# Patient Record
Sex: FEMALE | Race: WHITE | Employment: OTHER | URBAN - METROPOLITAN AREA
[De-identification: names, ages, dates, MRNs, and addresses within clinical notes are randomized per-mention and may not be internally consistent; named-entity substitution may affect disease eponyms.]

---

## 2019-08-27 ENCOUNTER — MEDICAL CORRESPONDENCE (OUTPATIENT)
Dept: HEALTH INFORMATION MANAGEMENT | Facility: CLINIC | Age: 69
End: 2019-08-27

## 2019-08-27 ENCOUNTER — TRANSFERRED RECORDS (OUTPATIENT)
Dept: HEALTH INFORMATION MANAGEMENT | Facility: CLINIC | Age: 69
End: 2019-08-27

## 2019-10-03 ENCOUNTER — TELEPHONE (OUTPATIENT)
Dept: OPHTHALMOLOGY | Facility: CLINIC | Age: 69
End: 2019-10-03

## 2019-10-03 ENCOUNTER — OFFICE VISIT (OUTPATIENT)
Dept: OPHTHALMOLOGY | Facility: CLINIC | Age: 69
End: 2019-10-03
Attending: OPHTHALMOLOGY
Payer: MEDICARE

## 2019-10-03 DIAGNOSIS — H55.09 DOWNBEAT NYSTAGMUS: ICD-10-CM

## 2019-10-03 DIAGNOSIS — H50.22 HYPERTROPIA OF LEFT EYE: ICD-10-CM

## 2019-10-03 DIAGNOSIS — H55.09 DOWNBEAT NYSTAGMUS: Primary | ICD-10-CM

## 2019-10-03 DIAGNOSIS — H53.10 SUBJECTIVE VISUAL DISTURBANCE: ICD-10-CM

## 2019-10-03 DIAGNOSIS — H53.10 SUBJECTIVE VISUAL DISTURBANCE: Primary | ICD-10-CM

## 2019-10-03 PROBLEM — M19.90 OSTEOARTHRITIS: Status: ACTIVE | Noted: 2017-07-03

## 2019-10-03 PROBLEM — E11.9 TYPE 2 DIABETES MELLITUS (H): Status: ACTIVE | Noted: 2018-10-17

## 2019-10-03 PROBLEM — E78.1 HYPERTRIGLYCERIDEMIA: Status: ACTIVE | Noted: 2017-10-30

## 2019-10-03 PROBLEM — A69.23 LYME ARTHRITIS (H): Status: ACTIVE | Noted: 2019-10-03

## 2019-10-03 PROBLEM — K21.9 GASTROESOPHAGEAL REFLUX DISEASE: Status: ACTIVE | Noted: 2017-07-03

## 2019-10-03 PROBLEM — I25.3 PFO WITH ATRIAL SEPTAL ANEURYSM: Status: ACTIVE | Noted: 2018-10-17

## 2019-10-03 PROBLEM — J45.909 ASTHMA: Status: ACTIVE | Noted: 2019-10-03

## 2019-10-03 PROBLEM — I63.40: Status: ACTIVE | Noted: 2019-10-03

## 2019-10-03 PROBLEM — G93.89 ENCEPHALOMALACIA: Status: ACTIVE | Noted: 2019-10-03

## 2019-10-03 PROBLEM — R13.10 DYSPHAGIA: Status: ACTIVE | Noted: 2018-10-17

## 2019-10-03 PROBLEM — I67.9 CEREBROVASCULAR SMALL VESSEL DISEASE: Status: ACTIVE | Noted: 2019-10-03

## 2019-10-03 PROBLEM — J30.2 SEASONAL ALLERGIC RHINITIS: Status: ACTIVE | Noted: 2019-10-03

## 2019-10-03 PROBLEM — J45.40 MODERATE PERSISTENT ASTHMA WITHOUT COMPLICATION: Status: ACTIVE | Noted: 2017-07-03

## 2019-10-03 PROBLEM — R47.01 EXPRESSIVE APHASIA: Status: ACTIVE | Noted: 2019-10-03

## 2019-10-03 PROBLEM — A69.20 LYME DISEASE: Status: ACTIVE | Noted: 2018-08-17

## 2019-10-03 PROBLEM — Z86.73 HX OF ISCHEMIC LEFT MCA STROKE: Status: ACTIVE | Noted: 2018-11-14

## 2019-10-03 PROBLEM — I63.412 CEREBRAL INFARCTION DUE TO EMBOLISM OF LEFT MIDDLE CEREBRAL ARTERY (H): Status: ACTIVE | Noted: 2019-10-03

## 2019-10-03 PROBLEM — G81.91 RIGHT HEMIPARESIS (H): Status: ACTIVE | Noted: 2019-10-03

## 2019-10-03 PROBLEM — E53.8 VITAMIN B12 DEFICIENCY: Status: ACTIVE | Noted: 2018-10-17

## 2019-10-03 PROBLEM — N18.30 CKD (CHRONIC KIDNEY DISEASE) STAGE 3, GFR 30-59 ML/MIN (H): Status: ACTIVE | Noted: 2017-07-03

## 2019-10-03 PROBLEM — N14.0 ANALGESIC NEPHROPATHY: Status: ACTIVE | Noted: 2018-03-12

## 2019-10-03 PROBLEM — Q21.12 PFO WITH ATRIAL SEPTAL ANEURYSM: Status: ACTIVE | Noted: 2018-10-17

## 2019-10-03 PROBLEM — F41.8 SITUATIONAL ANXIETY: Status: ACTIVE | Noted: 2018-05-21

## 2019-10-03 PROBLEM — M17.11 PRIMARY OSTEOARTHRITIS OF RIGHT KNEE: Status: ACTIVE | Noted: 2019-04-22

## 2019-10-03 LAB
ERYTHROCYTE [DISTWIDTH] IN BLOOD BY AUTOMATED COUNT: 14.9 % (ref 10–15)
HCT VFR BLD AUTO: 42.2 % (ref 35–47)
HGB BLD-MCNC: 13.6 G/DL (ref 11.7–15.7)
MAGNESIUM SERPL-MCNC: 2.4 MG/DL (ref 1.6–2.3)
MCH RBC QN AUTO: 28.8 PG (ref 26.5–33)
MCHC RBC AUTO-ENTMCNC: 32.2 G/DL (ref 31.5–36.5)
MCV RBC AUTO: 89 FL (ref 78–100)
PLATELET # BLD AUTO: 238 10E9/L (ref 150–450)
RBC # BLD AUTO: 4.73 10E12/L (ref 3.8–5.2)
WBC # BLD AUTO: 7.4 10E9/L (ref 4–11)

## 2019-10-03 PROCEDURE — 36415 COLL VENOUS BLD VENIPUNCTURE: CPT | Performed by: OPHTHALMOLOGY

## 2019-10-03 PROCEDURE — G0463 HOSPITAL OUTPT CLINIC VISIT: HCPCS | Mod: 25,ZF

## 2019-10-03 PROCEDURE — 92060 SENSORIMOTOR EXAMINATION: CPT | Mod: ZF | Performed by: OPHTHALMOLOGY

## 2019-10-03 PROCEDURE — 86341 ISLET CELL ANTIBODY: CPT | Performed by: OPHTHALMOLOGY

## 2019-10-03 RX ORDER — LIOTHYRONINE SODIUM 5 UG/1
TABLET ORAL
Refills: 3 | COMMUNITY
Start: 2019-09-05

## 2019-10-03 RX ORDER — ALBUTEROL SULFATE 5 MG/ML
2.5 SOLUTION RESPIRATORY (INHALATION)
COMMUNITY
Start: 2012-11-13

## 2019-10-03 RX ORDER — NALTREXONE HYDROCHLORIDE 50 MG/1
TABLET, FILM COATED ORAL
Refills: 0 | COMMUNITY
Start: 2019-09-16

## 2019-10-03 RX ORDER — LANOLIN ALCOHOL/MO/W.PET/CERES
1000 CREAM (GRAM) TOPICAL
COMMUNITY

## 2019-10-03 RX ORDER — ASPIRIN 81 MG/1
81 TABLET, CHEWABLE ORAL
COMMUNITY
Start: 2018-10-18

## 2019-10-03 RX ORDER — ALPRAZOLAM 0.5 MG
.25-.5 TABLET ORAL
COMMUNITY
Start: 2009-04-24

## 2019-10-03 RX ORDER — ESCITALOPRAM OXALATE 10 MG/1
10 TABLET ORAL
COMMUNITY
Start: 2018-08-08

## 2019-10-03 RX ORDER — PSEUDOEPHED/ACETAMINOPH/DIPHEN 30MG-500MG
TABLET ORAL
Refills: 0 | COMMUNITY
Start: 2019-04-24

## 2019-10-03 RX ORDER — TRIAMCINOLONE ACETONIDE 55 UG/1
2 SPRAY, METERED NASAL EVERY 24 HOURS
COMMUNITY
Start: 2012-11-13

## 2019-10-03 RX ORDER — BACLOFEN 10 MG/1
TABLET ORAL
Refills: 6 | COMMUNITY
Start: 2019-09-22

## 2019-10-03 RX ORDER — MECLIZINE HYDROCHLORIDE 25 MG/1
25 TABLET ORAL
COMMUNITY
Start: 2012-11-13

## 2019-10-03 RX ORDER — TRAZODONE HYDROCHLORIDE 100 MG/1
100 TABLET ORAL
COMMUNITY
Start: 1990-01-01

## 2019-10-03 RX ORDER — ATORVASTATIN CALCIUM 20 MG/1
20 TABLET, FILM COATED ORAL
COMMUNITY
Start: 2018-10-10

## 2019-10-03 RX ORDER — BUPROPION HYDROCHLORIDE 300 MG/1
TABLET ORAL
Refills: 4 | COMMUNITY
Start: 2019-09-24

## 2019-10-03 RX ORDER — METFORMIN HCL 500 MG
TABLET, EXTENDED RELEASE 24 HR ORAL
Refills: 4 | COMMUNITY
Start: 2019-09-05

## 2019-10-03 RX ORDER — CETIRIZINE HYDROCHLORIDE 10 MG/1
10 TABLET ORAL
COMMUNITY
Start: 1999-04-01

## 2019-10-03 ASSESSMENT — VISUAL ACUITY
OS_CC: 20/40
OD_CC: 20/40
METHOD: SNELLEN - LINEAR
CORRECTION_TYPE: GLASSES

## 2019-10-03 ASSESSMENT — CUP TO DISC RATIO
OD_RATIO: 0.2
OS_RATIO: 0.2

## 2019-10-03 ASSESSMENT — TONOMETRY
IOP_METHOD: ICARE
OS_IOP_MMHG: 11
OD_IOP_MMHG: 10

## 2019-10-03 ASSESSMENT — SLIT LAMP EXAM - LIDS
COMMENTS: NORMAL
COMMENTS: NORMAL

## 2019-10-03 ASSESSMENT — CONF VISUAL FIELD
OD_NORMAL: 1
OS_NORMAL: 1

## 2019-10-03 NOTE — TELEPHONE ENCOUNTER
Spoke to [facility].  Gave them verbal authorization for prescription for 4-aminopyridine 4 times daily with 11 refills.  They will contact patient to get set to her.      Sandra De Luna on 10/3/2019 at 4:15 PM

## 2019-10-03 NOTE — PROGRESS NOTES
Assessment & Plan     Laura Davis is a 69 year old female with the following diagnoses:   1. Downbeat nystagmus    2. Subjective visual disturbance    3. Hypertropia of left eye       Patient is a 69 year old female sent by Dr. Garcia for evaluation of oscillopsia and downbeat nystagmus. She had a left frontal stroke in October 2018, but she was having oscillopsia before her stroke. She notes that the oscillopsia was intermittent prior to the stroke. She has been taking oral B12 replacement for over the past 10 years. She reports that her dose was recently reduced 1 week ago due to being too high. She has been having headaches since her stroke. She has been started on Baclofen and does not report improvement since being on it. She has tried prism glasses which seemed to make her oscillopsia worse. She reports being clumsy all of her life and has had multiple ankle surgeries. She had a seizure when she had Lyme disease. Denies diplopia, eye pain, redness, diarrhea.     MRI brain 9/5/19  Impression:  1. New region of encephalomalacia in the left posterior frontal lobe that includes the lateral aspect of the precentral gyrus, and new focus of encephalomalacia in the left occipital cortex. Findings are consistent with interval new chronic infarcts.   2. Encephalomalacia in the right middle frontal gyrus and left superior parietal lobule, consistent with stable chronic infarcts. Small chronic infarcts in the cerebellum.   3. Multiple small discrete foci of T2 prolongation are present in the periventricular and subcortical white matter, nonspecific but most commonly noted in the setting of chronic small vessel ischemic changes.    POH: Refractive error, cataract surgery both eyes 2017  PMH: Asthma, GERD, Vitamin B 12 deficiency, fibromyalgia, hypothyroidism, dysthymic disorder, hyperlipidemia, hypothyroidism s/p surgical removal, Insulin resistance, Chronic kidney disease stage 3   FH: Mother with stroke at  age 39, negative for glaucoma, macular degeneration    Visual acuity is 20/40 in both eyes. Intraocular pressure is normal in both eyes . Pupils normal with no afferent pupillary defect. Color plates full in both eyes. Confrontational visual fields full both eyes. Motility full both eyes. Slit lamp exam with pseudophakia in both eyes. Dilated fundus exam unremarkable.    It is my impression that Laura has downbeat nystagmus. I have personally reviewed the MRI and there is cerebellar atrophy present most notably on the sagittal section.  We discussed that could medications 4-aminopyridine     The differential diagnosis for downbeat nystagmus is broad and includes structural etiologies typically seen on MRI (e.g. Arnold Chiari malformation, cerebellar atrophy / spinocerebellar degenerations, stroke, and mass lesions near the cervicomedullary junction, demyelinating disease), paraneoplastic cerebellar disorders, inflammatory cerebellar disorders (e.g. Celiac sprue associated cerebellar ataxia, anti-MAYNOR, Hashimotos), drug effects (e.g. Lithium, anti-seizure medications, ETOH, etc), and nutritional deficiencies (B-12, magnesium, thiamine).    With this in mind, I will recommend the following work-up as she has already had MRI brain:  - Labs: complete blood count (CBC), B-12, magnesium, thiamine, anti-MAYNOR.     Recommend discontinuing Baclofen and start 4-AP.  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  She has had a loop recorder and no arrhythmias documented (her  is a FP).  We discussed resting upright and also Kestenbaum surgery.  She will try resting upright and if that is not beneficial then she will try the 4-a P.  She will think about the Kestenbaum.         Attending Physician Attestation:  Complete documentation of historical and exam elements from today's encounter can be found in the full encounter summary report (not reduplicated in this progress note).  I  personally obtained the chief complaint(s) and history of present illness.  I confirmed and edited as necessary the review of systems, past medical/surgical history, family history, social history, and examination findings as documented by others; and I examined the patient myself.  I personally reviewed the relevant tests, images, and reports as documented above.  I formulated and edited as necessary the assessment and plan and discussed the findings and management plan with the patient and family. - Dustin Brown MD  Ophthalmology, PGY-5  Neuro-Ophthalmology Fellow

## 2019-10-03 NOTE — Clinical Note
10/3/2019       RE: Laura Davis  [address] [place] [address]     Dear Colleague,    Thank you for referring your patient, Laura Davis, to the EYE CLINIC at [facility]. Please see a copy of my visit note below.           Assessment & Plan     Laura Davis is a 69 year old female with the following diagnoses:   1. Downbeat nystagmus    2. Subjective visual disturbance    3. Hypertropia of left eye       Patient is a 69 year old female sent by Dr. Garcia for evaluation of oscillopsia and downbeat nystagmus. She had a left frontal stroke in October 2018, but she was having oscillopsia before her stroke. She notes that the oscillopsia was intermittent prior to the stroke. She has been taking oral B12 replacement for over the past 10 years. She reports that her dose was recently reduced 1 week ago due to being too high. She has been having headaches since her stroke. She has been started on Baclofen and does not report improvement since being on it. She has tried prism glasses which seemed to make her oscillopsia worse. She reports being clumsy all of her life and has had multiple ankle surgeries. She had a seizure when she had Lyme disease. Denies diplopia, eye pain, redness, diarrhea.     MRI brain 9/5/19  Impression:  1. New region of encephalomalacia in the left posterior frontal lobe that includes the lateral aspect of the precentral gyrus, and new focus of encephalomalacia in the left occipital cortex. Findings are consistent with interval new chronic infarcts.   2. Encephalomalacia in the right middle frontal gyrus and left superior parietal lobule, consistent with stable chronic infarcts. Small chronic infarcts in the cerebellum.   3. Multiple small discrete foci of T2 prolongation are present in the periventricular and subcortical white matter, nonspecific but most commonly noted in the setting of chronic small vessel ischemic changes.    POH:  Refractive error, cataract surgery both eyes 2017  PMH: Asthma, GERD, Vitamin B 12 deficiency, fibromyalgia, hypothyroidism, dysthymic disorder, hyperlipidemia, hypothyroidism s/p surgical removal, Insulin resistance, Chronic kidney disease stage 3   FH: Mother with stroke at age 39, negative for glaucoma, macular degeneration    Visual acuity is 20/40 in both eyes. Intraocular pressure is normal in both eyes . Pupils normal with no afferent pupillary defect. Color plates full in both eyes. Confrontational visual fields full both eyes. Motility full both eyes. Slit lamp exam with pseudophakia in both eyes. Dilated fundus exam unremarkable.    It is my impression that Laura has downbeat nystagmus. I have personally reviewed the MRI and there is cerebellar atrophy present most notably on the sagittal section.  We discussed that could medications 4-aminopyridine     The differential diagnosis for downbeat nystagmus is broad and includes structural etiologies typically seen on MRI (e.g. Arnold Chiari malformation, cerebellar atrophy / spinocerebellar degenerations, stroke, and mass lesions near the cervicomedullary junction, demyelinating disease), paraneoplastic cerebellar disorders, inflammatory cerebellar disorders (e.g. Celiac sprue associated cerebellar ataxia, anti-MAYNOR, Hashimotos), drug effects (e.g. Lithium, anti-seizure medications, ETOH, etc), and nutritional deficiencies (B-12, magnesium, thiamine).    With this in mind, I will recommend the following work-up as she has already had MRI brain:  - Labs: complete blood count (CBC), B-12, magnesium, thiamine, anti-MAYNOR.     Recommend discontinuing Baclofen and start 4-AP.  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  She has had a loop recorder and no arrhythmias documented (her  is a FP).  We discussed resting upright and also Kestenbaum surgery.  She will try resting upright and if that is not beneficial then  she will try the 4-a P.  She will think about the Kestenbaum.         Attending Physician Attestation:  Complete documentation of historical and exam elements from today's encounter can be found in the full encounter summary report (not reduplicated in this progress note).  I personally obtained the chief complaint(s) and history of present illness.  I confirmed and edited as necessary the review of systems, past medical/surgical history, family history, social history, and examination findings as documented by others; and I examined the patient myself.  I personally reviewed the relevant tests, images, and reports as documented above.  I formulated and edited as necessary the assessment and plan and discussed the findings and management plan with the patient and family. - Dustin Brown MD  Ophthalmology, PGY-5  Neuro-Ophthalmology Fellow      Again, thank you for allowing me to participate in the care of your patient.      Sincerely,    Dustin Nelson MD

## 2019-10-03 NOTE — LETTER
10/3/2019         RE:  :  MRN: Laura Davis  1950  [id number]     Dear Dr. Garcia,    Thank you for asking me to see your very pleasant patient, Laura Davis, in neuro-ophthalmic consultation.  I would like to thank you for sending your records and I have summarized them in the history of present illness. She presented with her spouse who provided additional history.  My assessment and plan are below.  For further details, please see my attached clinic note.        Assessment & Plan     Laura Davis is a 69 year old female with the following diagnoses:   1. Downbeat nystagmus    2. Subjective visual disturbance    3. Hypertropia of left eye       Patient is a 69 year old female sent by Dr. Garcia for evaluation of oscillopsia and downbeat nystagmus. She had a left frontal stroke in 2018, but she was having oscillopsia before her stroke. She notes that the oscillopsia was intermittent prior to the stroke. She has been taking oral B12 replacement for over the past 10 years. She reports that her dose was recently reduced 1 week ago due to being too high. She has been having headaches since her stroke. She has been started on Baclofen and does not report improvement since being on it. She has tried prism glasses which seemed to make her oscillopsia worse. She reports being clumsy all of her life and has had multiple ankle surgeries. She had a seizure when she had Lyme disease. Denies diplopia, eye pain, redness, diarrhea.     MRI brain 19  Impression:  1. New region of encephalomalacia in the left posterior frontal lobe that includes the lateral aspect of the precentral gyrus, and new focus of encephalomalacia in the left occipital cortex. Findings are consistent with interval new chronic infarcts.   2. Encephalomalacia in the right middle frontal gyrus and left superior parietal lobule, consistent with stable chronic infarcts. Small chronic infarcts in the cerebellum.   3. Multiple  small discrete foci of T2 prolongation are present in the periventricular and subcortical white matter, nonspecific but most commonly noted in the setting of chronic small vessel ischemic changes.    POH: Refractive error, cataract surgery both eyes 2017  PMH: Asthma, GERD, Vitamin B 12 deficiency, fibromyalgia, hypothyroidism, dysthymic disorder, hyperlipidemia, hypothyroidism s/p surgical removal, Insulin resistance, Chronic kidney disease stage 3   FH: Mother with stroke at age 39, negative for glaucoma, macular degeneration    Visual acuity is 20/40 in both eyes. Intraocular pressure is normal in both eyes . Pupils normal with no afferent pupillary defect. Color plates full in both eyes. Confrontational visual fields full both eyes. Motility full both eyes. Slit lamp exam with pseudophakia in both eyes. Dilated fundus exam unremarkable.    It is my impression that Laura has downbeat nystagmus. I have personally reviewed the MRI and there is cerebellar atrophy present most notably on the sagittal section.  We discussed that could medications 4-aminopyridine     The differential diagnosis for downbeat nystagmus is broad and includes structural etiologies typically seen on MRI (e.g. Arnold Chiari malformation, cerebellar atrophy / spinocerebellar degenerations, stroke, and mass lesions near the cervicomedullary junction, demyelinating disease), paraneoplastic cerebellar disorders, inflammatory cerebellar disorders (e.g. Celiac sprue associated cerebellar ataxia, anti-MAYNOR, Hashimotos), drug effects (e.g. Lithium, anti-seizure medications, ETOH, etc), and nutritional deficiencies (B-12, magnesium, thiamine).    With this in mind, I will recommend the following work-up as she has already had MRI brain:  - Labs: complete blood count (CBC), B-12, magnesium, thiamine, anti-MAYNOR.     Recommend discontinuing Baclofen and start 4-AP.  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   She has had a loop recorder and no arrhythmias documented (her  is a FP).  We discussed resting upright and also Kestenbaum surgery.  She will try resting upright and if that is not beneficial then she will try the 4-a P.  She will think about the Kestenbaum.    Again, thank you for allowing me to participate in the care of your patient.      Sincerely,    Dustin Nelson MD  Professor  Ophthalmology Residency   Director of Neuro-Ophthalmology  [employer] [facility] [employer] Endowed Chair  Departments of Ophthalmology, Neurology, and Neurosurgery  [facility] [address]  [address] [place] [address]  [address]  T - [phone]  F - [phone]  LILIBETH Sagastume [email or web address]      CC: Giuseppe Garcia MD  [facility]  [address] [facility] [address]  VIA Facsimile: [phone]     Daria Montgomery NP  [facility]  [address]  [facility] [address]  VIA Facsimile: [phone]       DX = downbeat nystagmus

## 2019-10-03 NOTE — NURSING NOTE
Chief Complaints and History of Present Illnesses   Patient presents with     Nystagmus Evaluation     Chief Complaint(s) and History of Present Illness(es)     Nystagmus Evaluation               Comments     Laura Davis is a 69 year old female who presents today for  History of old left frontal stroke with residual dysphia and mild right hemiparesis. Patient also has a history of downbeat nystagmus and complaining of constant oscillopsia after the stroke last year. No diplopia.     Taqueria MARTINEZ 1:20 PM October 3, 2019

## 2019-10-06 LAB — GAD65 AB SER IA-ACNC: <5 IU/ML (ref 0–5)

## 2019-10-07 LAB — VIT B1 BLD-MCNC: 96 NMOL/L (ref 70–180)

## 2019-11-07 ENCOUNTER — HEALTH MAINTENANCE LETTER (OUTPATIENT)
Age: 69
End: 2019-11-07

## 2020-02-17 ENCOUNTER — HEALTH MAINTENANCE LETTER (OUTPATIENT)
Age: 70
End: 2020-02-17

## 2020-11-29 ENCOUNTER — HEALTH MAINTENANCE LETTER (OUTPATIENT)
Age: 70
End: 2020-11-29

## 2021-04-10 ENCOUNTER — HEALTH MAINTENANCE LETTER (OUTPATIENT)
Age: 71
End: 2021-04-10

## 2021-05-29 ENCOUNTER — RECORDS - HEALTHEAST (OUTPATIENT)
Dept: ADMINISTRATIVE | Facility: CLINIC | Age: 71
End: 2021-05-29

## 2021-07-31 ENCOUNTER — HEALTH MAINTENANCE LETTER (OUTPATIENT)
Age: 71
End: 2021-07-31

## 2021-09-25 ENCOUNTER — HEALTH MAINTENANCE LETTER (OUTPATIENT)
Age: 71
End: 2021-09-25

## 2021-11-20 ENCOUNTER — HEALTH MAINTENANCE LETTER (OUTPATIENT)
Age: 71
End: 2021-11-20

## 2022-03-06 ENCOUNTER — HEALTH MAINTENANCE LETTER (OUTPATIENT)
Age: 72
End: 2022-03-06

## 2022-05-01 ENCOUNTER — HEALTH MAINTENANCE LETTER (OUTPATIENT)
Age: 72
End: 2022-05-01

## 2022-06-26 ENCOUNTER — HEALTH MAINTENANCE LETTER (OUTPATIENT)
Age: 72
End: 2022-06-26

## 2022-12-26 ENCOUNTER — HEALTH MAINTENANCE LETTER (OUTPATIENT)
Age: 72
End: 2022-12-26

## 2023-04-16 ENCOUNTER — HEALTH MAINTENANCE LETTER (OUTPATIENT)
Age: 73
End: 2023-04-16

## 2023-06-02 ENCOUNTER — HEALTH MAINTENANCE LETTER (OUTPATIENT)
Age: 73
End: 2023-06-02

## 2023-09-17 ENCOUNTER — HEALTH MAINTENANCE LETTER (OUTPATIENT)
Age: 73
End: 2023-09-17

## 2023-11-26 ENCOUNTER — HEALTH MAINTENANCE LETTER (OUTPATIENT)
Age: 73
End: 2023-11-26

## 2024-02-04 ENCOUNTER — HEALTH MAINTENANCE LETTER (OUTPATIENT)
Age: 74
End: 2024-02-04